# Patient Record
Sex: FEMALE | Race: WHITE | Employment: UNEMPLOYED | URBAN - METROPOLITAN AREA
[De-identification: names, ages, dates, MRNs, and addresses within clinical notes are randomized per-mention and may not be internally consistent; named-entity substitution may affect disease eponyms.]

---

## 2017-01-08 PROBLEM — R58 INTRAABDOMINAL HEMORRHAGE: Status: ACTIVE | Noted: 2017-01-08

## 2017-01-08 PROBLEM — K66.1 HEMOPERITONEUM (NONTRAUMATIC): Status: ACTIVE | Noted: 2017-01-08

## 2017-01-08 PROBLEM — D62 ACUTE POST-HEMORRHAGIC ANEMIA: Status: ACTIVE | Noted: 2017-01-08

## 2017-01-08 PROBLEM — E87.6 HYPOKALEMIA: Status: ACTIVE | Noted: 2017-01-08

## 2017-01-09 PROBLEM — R57.9 SHOCK CIRCULATORY (HCC): Status: ACTIVE | Noted: 2017-01-09

## 2017-01-09 PROBLEM — R73.9 HYPERGLYCEMIA: Status: ACTIVE | Noted: 2017-01-09

## 2017-02-02 ENCOUNTER — TELEPHONE (OUTPATIENT)
Dept: SURGERY | Age: 47
End: 2017-02-02

## 2017-02-09 ENCOUNTER — OFFICE VISIT (OUTPATIENT)
Dept: SURGERY | Age: 47
End: 2017-02-09

## 2017-02-09 VITALS
HEIGHT: 65 IN | DIASTOLIC BLOOD PRESSURE: 88 MMHG | SYSTOLIC BLOOD PRESSURE: 138 MMHG | WEIGHT: 109 LBS | BODY MASS INDEX: 18.16 KG/M2

## 2017-02-09 DIAGNOSIS — K66.1: ICD-10-CM

## 2017-02-09 DIAGNOSIS — R58 INTRAABDOMINAL HEMORRHAGE: Primary | ICD-10-CM

## 2017-02-09 PROCEDURE — 99024 POSTOP FOLLOW-UP VISIT: CPT | Performed by: SURGERY

## 2017-02-13 ENCOUNTER — TELEPHONE (OUTPATIENT)
Dept: INTERNAL MEDICINE CLINIC | Age: 47
End: 2017-02-13

## 2017-02-28 ENCOUNTER — OFFICE VISIT (OUTPATIENT)
Dept: RHEUMATOLOGY | Age: 47
End: 2017-02-28

## 2017-02-28 VITALS
SYSTOLIC BLOOD PRESSURE: 138 MMHG | HEIGHT: 65 IN | HEART RATE: 69 BPM | TEMPERATURE: 98.3 F | WEIGHT: 116 LBS | DIASTOLIC BLOOD PRESSURE: 88 MMHG | BODY MASS INDEX: 19.33 KG/M2

## 2017-02-28 DIAGNOSIS — I72.8 PSEUDOANEURYSM OF INTRA-ABDOMINAL ARTERY (HCC): Primary | ICD-10-CM

## 2017-02-28 DIAGNOSIS — D68.59 PROTEIN C DEFICIENCY (HCC): ICD-10-CM

## 2017-02-28 DIAGNOSIS — D68.59 PROTEIN S DEFICIENCY (HCC): ICD-10-CM

## 2017-02-28 PROCEDURE — 99245 OFF/OP CONSLTJ NEW/EST HI 55: CPT | Performed by: INTERNAL MEDICINE

## 2017-02-28 RX ORDER — HYDROCODONE BITARTRATE AND ACETAMINOPHEN 5; 325 MG/1; MG/1
1 TABLET ORAL EVERY 6 HOURS PRN
COMMUNITY
End: 2017-03-14 | Stop reason: ALTCHOICE

## 2017-03-02 DIAGNOSIS — I72.8 PSEUDOANEURYSM OF INTRA-ABDOMINAL ARTERY (HCC): ICD-10-CM

## 2017-03-02 LAB
BILIRUBIN URINE: NEGATIVE
BLOOD, URINE: ABNORMAL
C-REACTIVE PROTEIN: <0.3 MG/L (ref 0–5.1)
CLARITY: ABNORMAL
COLOR: YELLOW
CREATININE URINE: 229.2 MG/DL (ref 28–259)
EPITHELIAL CELLS, UA: 1 /HPF (ref 0–5)
GLUCOSE URINE: NEGATIVE MG/DL
HEPATITIS B CORE IGM ANTIBODY: NORMAL
HEPATITIS B SURFACE ANTIGEN INTERPRETATION: NORMAL
HEPATITIS C ANTIBODY INTERPRETATION: NORMAL
HYALINE CASTS: 1 /HPF (ref 0–8)
KETONES, URINE: NEGATIVE MG/DL
LEUKOCYTE ESTERASE, URINE: NEGATIVE
MICROSCOPIC EXAMINATION: YES
MUCUS: ABNORMAL /LPF
NITRITE, URINE: NEGATIVE
PH UA: 6
PROTEIN PROTEIN: 20 MG/DL
PROTEIN UA: 30 MG/DL
RBC UA: 2 /HPF (ref 0–4)
RHEUMATOID FACTOR: <10 IU/ML
SEDIMENTATION RATE, ERYTHROCYTE: 14 MM/HR (ref 0–20)
SPECIFIC GRAVITY UA: 1.02
URINE REFLEX TO CULTURE: ABNORMAL
URINE TYPE: ABNORMAL
UROBILINOGEN, URINE: 0.2 E.U./DL
WBC UA: 2 /HPF (ref 0–5)

## 2017-03-03 LAB
ALBUMIN SERPL-MCNC: 3.7 G/DL (ref 3.1–4.9)
ALPHA-1-GLOBULIN: 0.3 G/DL (ref 0.2–0.4)
ALPHA-2-GLOBULIN: 0.6 G/DL (ref 0.4–1.1)
ANTICARDIOLIPIN IGA ANTIBODY: 0 APL (ref 0–11)
ANTICARDIOLIPIN IGG ANTIBODY: 3 GPL (ref 0–14)
BETA GLOBULIN: 1.2 G/DL (ref 0.9–1.6)
BETA-2 GLYCOPROTEIN 1 IGG ANTIBODY: 0 SGU (ref 0–20)
BETA-2 GLYCOPROTEIN 1 IGM ANTIBODY: 6 SMU (ref 0–20)
CARDIOLIPIN AB IGM: 7 MPL (ref 0–12)
CCP IGG ANTIBODIES: 2 UNITS (ref 0–19)
ENA TO SSA (RO) ANTIBODY: NEGATIVE EU
ENA TO SSB (LA) ANTIBODY: NEGATIVE EU
GAMMA GLOBULIN: 1.5 G/DL (ref 0.6–1.8)
REASON FOR REJECTION: NORMAL
REJECTED TEST: NORMAL
SPE/IFE INTERPRETATION: NORMAL
TOTAL PROTEIN: 7.3 G/DL (ref 6.4–8.2)

## 2017-03-04 LAB — ANA BY IFA: ABNORMAL

## 2017-03-06 LAB — CRYOGLOBULIN, QUALITATIVE: NORMAL

## 2017-03-14 ENCOUNTER — OFFICE VISIT (OUTPATIENT)
Dept: RHEUMATOLOGY | Age: 47
End: 2017-03-14

## 2017-03-14 VITALS
WEIGHT: 114 LBS | TEMPERATURE: 97 F | HEART RATE: 64 BPM | BODY MASS INDEX: 18.99 KG/M2 | DIASTOLIC BLOOD PRESSURE: 92 MMHG | SYSTOLIC BLOOD PRESSURE: 122 MMHG

## 2017-03-14 DIAGNOSIS — R76.8 POSITIVE ANA (ANTINUCLEAR ANTIBODY): ICD-10-CM

## 2017-03-14 DIAGNOSIS — I72.8 PSEUDOANEURYSM OF INTRA-ABDOMINAL ARTERY (HCC): Primary | ICD-10-CM

## 2017-03-14 PROCEDURE — 99214 OFFICE O/P EST MOD 30 MIN: CPT | Performed by: INTERNAL MEDICINE

## 2017-03-16 ENCOUNTER — TELEPHONE (OUTPATIENT)
Dept: RHEUMATOLOGY | Age: 47
End: 2017-03-16

## 2017-03-16 DIAGNOSIS — I72.9 PSEUDOANEURYSM (HCC): Primary | ICD-10-CM

## 2017-03-22 ENCOUNTER — HOSPITAL ENCOUNTER (OUTPATIENT)
Dept: CT IMAGING | Age: 47
Discharge: HOME | End: 2017-03-22
Attending: INTERNAL MEDICINE | Admitting: INTERNAL MEDICINE

## 2017-03-22 DIAGNOSIS — I72.9 PSEUDOANEURYSM (HCC): ICD-10-CM

## 2017-03-22 DIAGNOSIS — R76.8 POSITIVE ANA (ANTINUCLEAR ANTIBODY): ICD-10-CM

## 2017-03-22 DIAGNOSIS — I72.9 ANEURYSM (HCC): ICD-10-CM

## 2017-03-22 LAB
GFR AFRICAN AMERICAN: >60
GFR NON-AFRICAN AMERICAN: 59
PERFORMED ON: ABNORMAL
POC CREATININE: 1 MG/DL (ref 0.6–1.1)
POC SAMPLE TYPE: ABNORMAL

## 2017-03-24 LAB
DOUBLE STRANDED DNA AB, IGG: NORMAL
ENA TO RNP ANTIBODY: NEGATIVE EU
ENA TO SMITH (SM) ANTIBODY: NEGATIVE EU

## 2017-04-04 ENCOUNTER — OFFICE VISIT (OUTPATIENT)
Dept: RHEUMATOLOGY | Age: 47
End: 2017-04-04

## 2017-04-04 VITALS
TEMPERATURE: 98.8 F | HEART RATE: 68 BPM | SYSTOLIC BLOOD PRESSURE: 126 MMHG | WEIGHT: 115 LBS | DIASTOLIC BLOOD PRESSURE: 88 MMHG | BODY MASS INDEX: 19.16 KG/M2

## 2017-04-04 DIAGNOSIS — R76.8 POSITIVE ANA (ANTINUCLEAR ANTIBODY): ICD-10-CM

## 2017-04-04 DIAGNOSIS — I72.8 PSEUDOANEURYSM OF INTRA-ABDOMINAL ARTERY (HCC): Primary | ICD-10-CM

## 2017-04-04 PROCEDURE — 99214 OFFICE O/P EST MOD 30 MIN: CPT | Performed by: INTERNAL MEDICINE

## 2017-06-19 ENCOUNTER — PAT TELEPHONE (OUTPATIENT)
Dept: PREADMISSION TESTING | Age: 47
End: 2017-06-19

## 2017-06-19 VITALS — HEIGHT: 65 IN | BODY MASS INDEX: 19.16 KG/M2 | WEIGHT: 115 LBS

## 2017-06-22 ENCOUNTER — HOSPITAL ENCOUNTER (OUTPATIENT)
Dept: ENDOSCOPY | Age: 47
Discharge: HOME | End: 2017-06-22
Attending: INTERNAL MEDICINE | Admitting: INTERNAL MEDICINE

## 2017-06-22 VITALS
SYSTOLIC BLOOD PRESSURE: 148 MMHG | DIASTOLIC BLOOD PRESSURE: 91 MMHG | WEIGHT: 113.1 LBS | HEART RATE: 51 BPM | RESPIRATION RATE: 16 BRPM | OXYGEN SATURATION: 98 % | TEMPERATURE: 97.4 F | BODY MASS INDEX: 18.82 KG/M2

## 2017-06-22 RX ORDER — OXYCODONE HYDROCHLORIDE AND ACETAMINOPHEN 5; 325 MG/1; MG/1
1 TABLET ORAL PRN
Status: ACTIVE | OUTPATIENT
Start: 2017-06-22 | End: 2017-06-22

## 2017-06-22 RX ORDER — OXYCODONE HYDROCHLORIDE AND ACETAMINOPHEN 5; 325 MG/1; MG/1
2 TABLET ORAL PRN
Status: ACTIVE | OUTPATIENT
Start: 2017-06-22 | End: 2017-06-22

## 2017-06-22 RX ORDER — FENTANYL CITRATE 50 UG/ML
25 INJECTION, SOLUTION INTRAMUSCULAR; INTRAVENOUS EVERY 5 MIN PRN
Status: DISCONTINUED | OUTPATIENT
Start: 2017-06-22 | End: 2017-06-23 | Stop reason: HOSPADM

## 2017-06-22 RX ORDER — FENTANYL CITRATE 50 UG/ML
50 INJECTION, SOLUTION INTRAMUSCULAR; INTRAVENOUS EVERY 5 MIN PRN
Status: DISCONTINUED | OUTPATIENT
Start: 2017-06-22 | End: 2017-06-23 | Stop reason: HOSPADM

## 2017-06-22 RX ORDER — MORPHINE SULFATE 2 MG/ML
1 INJECTION, SOLUTION INTRAMUSCULAR; INTRAVENOUS EVERY 5 MIN PRN
Status: DISCONTINUED | OUTPATIENT
Start: 2017-06-22 | End: 2017-06-23 | Stop reason: HOSPADM

## 2017-06-22 RX ORDER — ONDANSETRON 2 MG/ML
4 INJECTION INTRAMUSCULAR; INTRAVENOUS
Status: ACTIVE | OUTPATIENT
Start: 2017-06-22 | End: 2017-06-22

## 2017-06-22 RX ORDER — SODIUM CHLORIDE 0.9 % (FLUSH) 0.9 %
10 SYRINGE (ML) INJECTION PRN
Status: DISCONTINUED | OUTPATIENT
Start: 2017-06-22 | End: 2017-06-23 | Stop reason: HOSPADM

## 2017-06-22 RX ORDER — SODIUM CHLORIDE 9 MG/ML
INJECTION, SOLUTION INTRAVENOUS CONTINUOUS
Status: DISCONTINUED | OUTPATIENT
Start: 2017-06-22 | End: 2017-06-23 | Stop reason: HOSPADM

## 2017-06-22 RX ORDER — SODIUM CHLORIDE 0.9 % (FLUSH) 0.9 %
10 SYRINGE (ML) INJECTION EVERY 12 HOURS SCHEDULED
Status: DISCONTINUED | OUTPATIENT
Start: 2017-06-22 | End: 2017-06-23 | Stop reason: HOSPADM

## 2017-06-22 RX ORDER — MORPHINE SULFATE 2 MG/ML
2 INJECTION, SOLUTION INTRAMUSCULAR; INTRAVENOUS EVERY 5 MIN PRN
Status: DISCONTINUED | OUTPATIENT
Start: 2017-06-22 | End: 2017-06-23 | Stop reason: HOSPADM

## 2017-06-22 RX ADMIN — SODIUM CHLORIDE: 9 INJECTION, SOLUTION INTRAVENOUS at 11:19

## 2017-06-22 ASSESSMENT — PAIN SCALES - GENERAL
PAINLEVEL_OUTOF10: 0

## 2017-06-22 ASSESSMENT — PAIN - FUNCTIONAL ASSESSMENT: PAIN_FUNCTIONAL_ASSESSMENT: 0-10

## 2017-06-22 ASSESSMENT — ENCOUNTER SYMPTOMS: SHORTNESS OF BREATH: 0

## 2017-07-06 ENCOUNTER — OFFICE VISIT (OUTPATIENT)
Dept: RHEUMATOLOGY | Age: 47
End: 2017-07-06

## 2017-07-06 VITALS
BODY MASS INDEX: 19.39 KG/M2 | WEIGHT: 116.4 LBS | SYSTOLIC BLOOD PRESSURE: 120 MMHG | HEART RATE: 60 BPM | HEIGHT: 65 IN | DIASTOLIC BLOOD PRESSURE: 84 MMHG

## 2017-07-06 DIAGNOSIS — R76.8 POSITIVE ANA (ANTINUCLEAR ANTIBODY): ICD-10-CM

## 2017-07-06 DIAGNOSIS — I72.8 PSEUDOANEURYSM OF INTRA-ABDOMINAL ARTERY (HCC): Primary | ICD-10-CM

## 2017-07-06 PROCEDURE — 99214 OFFICE O/P EST MOD 30 MIN: CPT | Performed by: INTERNAL MEDICINE

## 2017-07-13 ENCOUNTER — HOSPITAL ENCOUNTER (OUTPATIENT)
Dept: CT IMAGING | Age: 47
Discharge: HOME | End: 2017-07-13
Attending: INTERNAL MEDICINE | Admitting: INTERNAL MEDICINE

## 2017-07-13 DIAGNOSIS — I72.8 PSEUDOANEURYSM OF INTRA-ABDOMINAL ARTERY (HCC): ICD-10-CM

## 2017-07-13 LAB
A/G RATIO: 1.5 (ref 1.1–2.2)
ALBUMIN SERPL-MCNC: 4.3 G/DL (ref 3.4–5)
ALP BLD-CCNC: 73 U/L (ref 40–129)
ALT SERPL-CCNC: 26 U/L (ref 10–40)
ANION GAP SERPL CALCULATED.3IONS-SCNC: 9 MMOL/L (ref 3–16)
AST SERPL-CCNC: 22 U/L (ref 15–37)
BASOPHILS ABSOLUTE: 0 K/UL (ref 0–0.2)
BASOPHILS RELATIVE PERCENT: 1.1 %
BILIRUB SERPL-MCNC: 1 MG/DL (ref 0–1)
BUN BLDV-MCNC: 14 MG/DL (ref 7–20)
CALCIUM SERPL-MCNC: 9.5 MG/DL (ref 8.3–10.6)
CHLORIDE BLD-SCNC: 101 MMOL/L (ref 99–110)
CO2: 33 MMOL/L (ref 21–32)
CREAT SERPL-MCNC: 0.7 MG/DL (ref 0.6–1.1)
EOSINOPHILS ABSOLUTE: 0.2 K/UL (ref 0–0.6)
EOSINOPHILS RELATIVE PERCENT: 5.4 %
GFR AFRICAN AMERICAN: >60
GFR NON-AFRICAN AMERICAN: >60
GLOBULIN: 2.8 G/DL
GLUCOSE BLD-MCNC: 91 MG/DL (ref 70–99)
HCT VFR BLD CALC: 43.9 % (ref 36–48)
HEMOGLOBIN: 14.9 G/DL (ref 12–16)
LYMPHOCYTES ABSOLUTE: 1.4 K/UL (ref 1–5.1)
LYMPHOCYTES RELATIVE PERCENT: 41.2 %
MCH RBC QN AUTO: 31.8 PG (ref 26–34)
MCHC RBC AUTO-ENTMCNC: 34 G/DL (ref 31–36)
MCV RBC AUTO: 93.5 FL (ref 80–100)
MONOCYTES ABSOLUTE: 0.3 K/UL (ref 0–1.3)
MONOCYTES RELATIVE PERCENT: 8.6 %
NEUTROPHILS ABSOLUTE: 1.5 K/UL (ref 1.7–7.7)
NEUTROPHILS RELATIVE PERCENT: 43.7 %
PDW BLD-RTO: 13.9 % (ref 12.4–15.4)
PLATELET # BLD: 192 K/UL (ref 135–450)
PMV BLD AUTO: 8.6 FL (ref 5–10.5)
POTASSIUM SERPL-SCNC: 3.3 MMOL/L (ref 3.5–5.1)
RBC # BLD: 4.69 M/UL (ref 4–5.2)
SODIUM BLD-SCNC: 143 MMOL/L (ref 136–145)
TOTAL PROTEIN: 7.1 G/DL (ref 6.4–8.2)
WBC # BLD: 3.5 K/UL (ref 4–11)

## 2017-07-16 PROBLEM — Z08 ENCOUNTER FOR FOLLOW-UP EXAMINATION AFTER COMPLETED TREATMENT FOR MALIGNANT NEOPLASM: Status: ACTIVE | Noted: 2017-07-16

## 2017-11-17 ENCOUNTER — HOSPITAL ENCOUNTER (OUTPATIENT)
Dept: CT IMAGING | Age: 47
Discharge: HOME | End: 2017-11-17
Attending: SURGERY | Admitting: SURGERY

## 2017-11-17 DIAGNOSIS — I71.40 ABDOMINAL AORTIC ANEURYSM WITHOUT RUPTURE: ICD-10-CM

## 2018-01-10 ENCOUNTER — OFFICE VISIT (OUTPATIENT)
Dept: RHEUMATOLOGY | Age: 48
End: 2018-01-10

## 2018-01-10 VITALS
SYSTOLIC BLOOD PRESSURE: 118 MMHG | BODY MASS INDEX: 19.83 KG/M2 | HEIGHT: 65 IN | WEIGHT: 119 LBS | DIASTOLIC BLOOD PRESSURE: 82 MMHG

## 2018-01-10 DIAGNOSIS — E16.2 LOW BLOOD GLUCOSE MEASUREMENT: Primary | ICD-10-CM

## 2018-01-10 DIAGNOSIS — I72.8 PSEUDOANEURYSM OF INTRA-ABDOMINAL ARTERY (HCC): ICD-10-CM

## 2018-01-10 DIAGNOSIS — R76.8 POSITIVE ANA (ANTINUCLEAR ANTIBODY): ICD-10-CM

## 2018-01-10 DIAGNOSIS — I72.8 PSEUDOANEURYSM OF INTRA-ABDOMINAL ARTERY (HCC): Primary | ICD-10-CM

## 2018-01-10 LAB
A/G RATIO: 1.9 (ref 1.1–2.2)
ALBUMIN SERPL-MCNC: 4.3 G/DL (ref 3.4–5)
ALP BLD-CCNC: 62 U/L (ref 40–129)
ALT SERPL-CCNC: 22 U/L (ref 10–40)
ANION GAP SERPL CALCULATED.3IONS-SCNC: 10 MMOL/L (ref 3–16)
AST SERPL-CCNC: 22 U/L (ref 15–37)
BASOPHILS ABSOLUTE: 0 K/UL (ref 0–0.2)
BASOPHILS RELATIVE PERCENT: 0.7 %
BILIRUB SERPL-MCNC: 0.5 MG/DL (ref 0–1)
BUN BLDV-MCNC: 24 MG/DL (ref 7–20)
C-REACTIVE PROTEIN: <0.3 MG/L (ref 0–5.1)
CALCIUM SERPL-MCNC: 9.8 MG/DL (ref 8.3–10.6)
CHLORIDE BLD-SCNC: 97 MMOL/L (ref 99–110)
CO2: 34 MMOL/L (ref 21–32)
CREAT SERPL-MCNC: 0.7 MG/DL (ref 0.6–1.1)
EOSINOPHILS ABSOLUTE: 0.2 K/UL (ref 0–0.6)
EOSINOPHILS RELATIVE PERCENT: 3.7 %
GFR AFRICAN AMERICAN: >60
GFR NON-AFRICAN AMERICAN: >60
GLOBULIN: 2.3 G/DL
GLUCOSE BLD-MCNC: 55 MG/DL (ref 70–99)
HCT VFR BLD CALC: 42.2 % (ref 36–48)
HEMOGLOBIN: 14 G/DL (ref 12–16)
LYMPHOCYTES ABSOLUTE: 1.2 K/UL (ref 1–5.1)
LYMPHOCYTES RELATIVE PERCENT: 29.4 %
MCH RBC QN AUTO: 32.6 PG (ref 26–34)
MCHC RBC AUTO-ENTMCNC: 33.2 G/DL (ref 31–36)
MCV RBC AUTO: 98.3 FL (ref 80–100)
MONOCYTES ABSOLUTE: 0.4 K/UL (ref 0–1.3)
MONOCYTES RELATIVE PERCENT: 10.6 %
NEUTROPHILS ABSOLUTE: 2.3 K/UL (ref 1.7–7.7)
NEUTROPHILS RELATIVE PERCENT: 55.6 %
PDW BLD-RTO: 13.3 % (ref 12.4–15.4)
PLATELET # BLD: 188 K/UL (ref 135–450)
PMV BLD AUTO: 9.3 FL (ref 5–10.5)
POTASSIUM SERPL-SCNC: 3.7 MMOL/L (ref 3.5–5.1)
RBC # BLD: 4.29 M/UL (ref 4–5.2)
SEDIMENTATION RATE, ERYTHROCYTE: 11 MM/HR (ref 0–20)
SODIUM BLD-SCNC: 141 MMOL/L (ref 136–145)
TOTAL PROTEIN: 6.6 G/DL (ref 6.4–8.2)
WBC # BLD: 4.2 K/UL (ref 4–11)

## 2018-01-10 PROCEDURE — 99214 OFFICE O/P EST MOD 30 MIN: CPT | Performed by: INTERNAL MEDICINE

## 2018-01-10 NOTE — PROGRESS NOTES
for vasculitis, SPEP/UPEP, no proteinuria, negative APL panel, negative cryoglobulins  -Low titer positive PATRICIO, no other evidence of connective tissue disease  - CTA abdominal aorta on November 2017 without evidence of high-grade stenosis or arterial occlusion  -Continue to monitor periodically  - CBC Auto Differential; Future  - Comprehensive Metabolic Panel; Future  - C-Reactive Protein; Future  - Sedimentation Rate; Future    2. Positive PATRICIO (antinuclear antibody)  1:160 mixed homogeneous and speckled pattern  -Other serologies negative  No signs and symptoms of connective tissue disease  - continue to monitor   -I The patient indicates understanding of these issues and agrees with the plan. Return in about 6 months (around 7/10/2018). The risks and benefits of my recommendations, as well as other treatment options, benefits and side effects were discussed with the patient. All questions were answered. ######################################################################    I thank you for giving me the opportunity to participate in [facility]. If you have any questions or concerns please feel free to contact me. I look forward to following  Alia See along with you. Electronically signed by: Latoya Jimenez MD, 1/10/2018 3:25 PM    Documentation was done using voice recognition dragon software. Every effort was made to ensure accuracy; however, inadvertent unintentional computerized transcription errors may be present.

## 2018-06-05 ENCOUNTER — HOSPITAL ENCOUNTER (OUTPATIENT)
Dept: MRI IMAGING | Age: 48
Discharge: HOME | End: 2018-06-05
Attending: INTERNAL MEDICINE | Admitting: INTERNAL MEDICINE

## 2018-06-05 DIAGNOSIS — K86.2 CYST OF PANCREAS: ICD-10-CM

## 2018-06-05 DIAGNOSIS — K86.3 CYST AND PSEUDOCYST OF PANCREAS: ICD-10-CM

## 2018-06-05 DIAGNOSIS — K86.2 CYST AND PSEUDOCYST OF PANCREAS: ICD-10-CM

## 2018-07-10 ENCOUNTER — OFFICE VISIT (OUTPATIENT)
Dept: RHEUMATOLOGY | Age: 48
End: 2018-07-10

## 2018-07-10 VITALS
HEIGHT: 65 IN | HEART RATE: 64 BPM | BODY MASS INDEX: 19.22 KG/M2 | SYSTOLIC BLOOD PRESSURE: 140 MMHG | DIASTOLIC BLOOD PRESSURE: 100 MMHG | WEIGHT: 115.4 LBS

## 2018-07-10 DIAGNOSIS — R76.8 POSITIVE ANA (ANTINUCLEAR ANTIBODY): ICD-10-CM

## 2018-07-10 DIAGNOSIS — I72.8 PSEUDOANEURYSM OF INTRA-ABDOMINAL ARTERY (HCC): Primary | ICD-10-CM

## 2018-07-10 PROCEDURE — 99214 OFFICE O/P EST MOD 30 MIN: CPT | Performed by: INTERNAL MEDICINE

## 2018-07-10 RX ORDER — HYDROCHLOROTHIAZIDE 12.5 MG/1
12.5 CAPSULE, GELATIN COATED ORAL DAILY
COMMUNITY

## 2018-07-10 NOTE — PROGRESS NOTES
noted   Neurologic:   Alert & oriented x 3, CN 2-12 normal, no focal deficits noted. Sensations Intact. Muscle strength 5/5 proximally and distally in upper and lower extremities. Psychiatric:  Speech and behavior appropriate           LABS AND IMAGING    Lab Results   Component Value Date    WBC 4.2 01/10/2018    HGB 14.0 01/10/2018    HCT 42.2 01/10/2018    MCV 98.3 01/10/2018     01/10/2018    LABLYMP 1.8 07/05/2016    MID 0.4 07/05/2016    GRAN 3.1 07/05/2016    LYMPHOPCT 29.4 01/10/2018    MIDPERCENT 7.8 07/05/2016    GRANULOCYTES 58.8 07/05/2016    RBC 4.29 01/10/2018    MCH 32.6 01/10/2018    MCHC 33.2 01/10/2018    RDW 13.3 01/10/2018     Lab Results   Component Value Date    CREATININE 0.7 01/10/2018    BUN 24 (H) 01/10/2018     01/10/2018    K 3.7 01/10/2018    CL 97 (L) 01/10/2018    CO2 34 (H) 01/10/2018     Lab Results   Component Value Date    ALT 22 01/10/2018    AST 22 01/10/2018    ALKPHOS 62 01/10/2018    BILITOT 0.5 01/10/2018     CT abdominal aorta 11/17/2017   EXAMINATION:   CTA OF THE AORTA WITH LOWER EXTREMITY RUNOFF 11/17/2017 9:20 am       TECHNIQUE:   CTA of the pelvis and bilateral lower extremities was performed after the   administration of intravenous contrast.   Multiplanar reformatted images are   provided for review.  MIP images are provided for review. Dose modulation,   iterative reconstruction, and/or weight based adjustment of the mA/kV was   utilized to reduce the radiation dose to as low as reasonably achievable.       COMPARISON:   01/22/2017       HISTORY:   ORDERING PHYSICIAN PROVIDED HISTORY: Abdominal aortic aneurysm without   rupture [facility])   TECHNOLOGIST PROVIDED HISTORY:   Technologist Provided Reason for Exam: follow up aaa repair and also colon   removed at that time fro ischemia.  hx hyst. hx breast ca left mast/chemo/rad   2009   Acuity: Chronic   Type of Encounter: Subsequent/Follow-up       FINDINGS:   Nonvascular       Lower Chest: Lung bases are clear.  Patient status post left mastectomy.       Organs: Liver, gallbladder, pancreas, spleen and adrenal glands are   unremarkable. Brunetta Pert demonstrate no enhancing mass or hydronephrosis.       GI/Bowel: Bowel loops are nondistended.  There is moderate colonic stool.  No   inflammation or fluid collection noted.       Pelvis: Bladder is decompressed.  Uterus is surgically absent.  No adnexal   mass or fluid collection identified.       Peritoneum/Retroperitoneum: There are surgical clips in the right   retroperitoneum posterior to the pancreas and anterior to the IVC.       Bones/Soft Tissues: Chronic degenerative changes of the bones are seen.       VASCULAR       The aorta is normal in caliber.  It is somewhat tortuous.  The origins of the   celiac, superior mesenteric, right and left and 2nd left renal arteries are   patent.  The inferior mesenteric artery origin is patent.  Common iliac,   external iliac and common femoral arteries are patent.  The superficial   femoral and popliteal arteries are widely patent.  Below the knees, contrast   enhancement is diminished in therefore evaluation is limited.  Trifurcation   arteries are patent proximally and to the mid calf.  They are not well seen   distally.           Impression   No acute arterial occlusion or high-grade stenosis is identified.  No acute   process.           No images are attached to the encounter or orders placed in the encounter. ASSESSMENT:    1. Pseudoaneurysm of intra-abdominal artery ([address]    2. Positive PATRICIO (antinuclear antibody)        PLAN:   1.  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

## 2018-11-16 ENCOUNTER — TELEPHONE (OUTPATIENT)
Dept: INTERNAL MEDICINE CLINIC | Age: 48
End: 2018-11-16

## 2018-11-16 DIAGNOSIS — I71.9 PSEUDOANEURYSM OF AORTA (HCC): Primary | ICD-10-CM

## 2018-11-28 ENCOUNTER — HOSPITAL ENCOUNTER (OUTPATIENT)
Dept: CT IMAGING | Age: 48
Discharge: HOME | End: 2018-11-28
Payer: COMMERCIAL

## 2018-11-28 DIAGNOSIS — I72.8 PSEUDOANEURYSM OF INTRA-ABDOMINAL ARTERY (HCC): ICD-10-CM

## 2018-11-28 DIAGNOSIS — I71.9 PSEUDOANEURYSM OF AORTA (HCC): ICD-10-CM

## 2018-11-28 LAB
A/G RATIO: 1.5 (ref 1.1–2.2)
ALBUMIN SERPL-MCNC: 4.2 G/DL (ref 3.4–5)
ALP BLD-CCNC: 64 U/L (ref 40–129)
ALT SERPL-CCNC: 25 U/L (ref 10–40)
ANION GAP SERPL CALCULATED.3IONS-SCNC: 11 MMOL/L (ref 3–16)
AST SERPL-CCNC: 26 U/L (ref 15–37)
BASOPHILS ABSOLUTE: 0 K/UL (ref 0–0.2)
BASOPHILS RELATIVE PERCENT: 1 %
BILIRUB SERPL-MCNC: 0.8 MG/DL (ref 0–1)
BUN BLDV-MCNC: 22 MG/DL (ref 7–20)
C-REACTIVE PROTEIN: <0.3 MG/L (ref 0–5.1)
CALCIUM SERPL-MCNC: 9.6 MG/DL (ref 8.3–10.6)
CHLORIDE BLD-SCNC: 99 MMOL/L (ref 99–110)
CO2: 29 MMOL/L (ref 21–32)
CREAT SERPL-MCNC: 0.7 MG/DL (ref 0.6–1.1)
EOSINOPHILS ABSOLUTE: 0.1 K/UL (ref 0–0.6)
EOSINOPHILS RELATIVE PERCENT: 4.3 %
GFR AFRICAN AMERICAN: >60
GFR NON-AFRICAN AMERICAN: >60
GLOBULIN: 2.8 G/DL
GLUCOSE BLD-MCNC: 78 MG/DL (ref 70–99)
HCT VFR BLD CALC: 41.4 % (ref 36–48)
HEMOGLOBIN: 13.9 G/DL (ref 12–16)
LYMPHOCYTES ABSOLUTE: 1.3 K/UL (ref 1–5.1)
LYMPHOCYTES RELATIVE PERCENT: 38.2 %
MCH RBC QN AUTO: 32.3 PG (ref 26–34)
MCHC RBC AUTO-ENTMCNC: 33.7 G/DL (ref 31–36)
MCV RBC AUTO: 95.8 FL (ref 80–100)
MONOCYTES ABSOLUTE: 0.3 K/UL (ref 0–1.3)
MONOCYTES RELATIVE PERCENT: 8.6 %
NEUTROPHILS ABSOLUTE: 1.7 K/UL (ref 1.7–7.7)
NEUTROPHILS RELATIVE PERCENT: 47.9 %
PDW BLD-RTO: 12.7 % (ref 12.4–15.4)
PLATELET # BLD: 198 K/UL (ref 135–450)
PMV BLD AUTO: 9.3 FL (ref 5–10.5)
POTASSIUM SERPL-SCNC: 3.4 MMOL/L (ref 3.5–5.1)
RBC # BLD: 4.32 M/UL (ref 4–5.2)
SEDIMENTATION RATE, ERYTHROCYTE: 9 MM/HR (ref 0–20)
SODIUM BLD-SCNC: 139 MMOL/L (ref 136–145)
TOTAL PROTEIN: 7 G/DL (ref 6.4–8.2)
WBC # BLD: 3.5 K/UL (ref 4–11)

## 2018-11-28 PROCEDURE — 85652 RBC SED RATE AUTOMATED: CPT

## 2018-11-28 PROCEDURE — 36415 COLL VENOUS BLD VENIPUNCTURE: CPT

## 2018-11-28 PROCEDURE — 85025 COMPLETE CBC W/AUTO DIFF WBC: CPT

## 2018-11-28 PROCEDURE — 86140 C-REACTIVE PROTEIN: CPT

## 2018-11-28 PROCEDURE — 6360000004 HC RX CONTRAST MEDICATION: Performed by: INTERNAL MEDICINE

## 2018-11-28 PROCEDURE — 80053 COMPREHEN METABOLIC PANEL: CPT

## 2018-11-28 PROCEDURE — 75635 CT ANGIO ABDOMINAL ARTERIES: CPT

## 2018-11-28 RX ADMIN — IOPAMIDOL 75 ML: 755 INJECTION, SOLUTION INTRAVENOUS at 08:09

## 2019-01-03 ENCOUNTER — TELEPHONE (OUTPATIENT)
Dept: SURGERY | Age: 49
End: 2019-01-03

## 2019-06-17 ENCOUNTER — HOSPITAL ENCOUNTER (OUTPATIENT)
Dept: OTHER | Age: 49
Discharge: HOME | End: 2019-06-17

## 2019-06-17 VITALS
WEIGHT: 114.25 LBS | DIASTOLIC BLOOD PRESSURE: 109 MMHG | BODY MASS INDEX: 19.01 KG/M2 | HEART RATE: 67 BPM | SYSTOLIC BLOOD PRESSURE: 143 MMHG

## 2019-06-17 PROCEDURE — 9900000078 HC MONA LISA TOUCH THERAPY PRE PAY

## 2019-06-17 NOTE — PROGRESS NOTES
by mouth daily       Omega-3 Fatty Acids (FISH OIL) 1200 MG CAPS Take 1 capsule by mouth daily       Calcium-Magnesium-Vitamin D (CALCIUM 500 PO) Take  by mouth 2 times daily.  carvedilol (COREG) 3.125 MG tablet Take 3.125 mg by mouth 2 times daily (with meals). No current facility-administered medications on file prior to encounter. REVIEW OF SYSTEMS    Pertinent items are noted in HPI.     Objective:      BP (!) 143/109   Pulse 67   Wt 114 lb 4 oz (51.8 kg)   BMI 19.01 kg/m²     Wt Readings from Last 2 Encounters:   06/17/19 114 lb 4 oz (51.8 kg)   07/10/18 115 lb 6.4 oz (52.3 kg)       PHYSICAL EXAM    General Appearance: alert and oriented to person, place and time, well developed and well- nourished, in no acute distress  Skin: warm and dry, no rash or erythema  Head: normocephalic and atraumatic  Eyes: pupils equal, round, and reactive to light, extraocular eye movements intact, conjunctivae normal  ENT: tympanic membrane, external ear and ear canal normal bilaterally, nose without deformity, nasal mucosa and turbinates normal without polyps  Neck: supple and non-tender without mass, no thyromegaly or thyroid nodules, no cervical lymphadenopathy  Pulmonary/Chest: clear to auscultation bilaterally- no wheezes, rales or rhonchi, normal air movement, no respiratory distress  Cardiovascular: normal rate, regular rhythm, normal S1 and S2, no murmurs, rubs, clicks, or gallops, distal pulses intact, no carotid bruits  Abdomen: soft, non-tender, non-distended, normal bowel sounds, no masses or organomegaly  Pelvic: normal external genitalia, vulva, atrophic vagina, surgically absent cervix and uterus   Extremities: no cyanosis, clubbing or edema  Musculoskeletal: normal range of motion, no joint swelling, deformity or tenderness  Neurologic: reflexes normal and symmetric, no cranial nerve deficit, gait, coordination and speech normal      Assessment:     Patient Active Problem List   Diagnosis MD on 6/17/2019 at 11:28 AM

## 2019-06-17 NOTE — FLOWSHEET NOTE
Patient here for her first Jasmin Quan treatment. Chart reviewed with patient r/t medications, medical and surgical hx.

## 2019-06-17 NOTE — FLOWSHEET NOTE
Discharge instructions provided. Pt. Verbalized understanding. Patient left the [facility] in stable condition.

## 2019-08-05 ENCOUNTER — HOSPITAL ENCOUNTER (OUTPATIENT)
Dept: OTHER | Age: 49
Discharge: HOME | End: 2019-08-05

## 2019-08-05 VITALS
WEIGHT: 112.38 LBS | BODY MASS INDEX: 18.7 KG/M2 | HEART RATE: 66 BPM | DIASTOLIC BLOOD PRESSURE: 93 MMHG | SYSTOLIC BLOOD PRESSURE: 148 MMHG

## 2019-08-05 PROCEDURE — 9900000078 HC MONA LISA TOUCH THERAPY PRE PAY

## 2019-09-16 ENCOUNTER — HOSPITAL ENCOUNTER (OUTPATIENT)
Dept: OTHER | Age: 49
Discharge: HOME | End: 2019-09-16

## 2019-09-16 VITALS — WEIGHT: 114 LBS | BODY MASS INDEX: 18.97 KG/M2 | SYSTOLIC BLOOD PRESSURE: 152 MMHG | DIASTOLIC BLOOD PRESSURE: 100 MMHG

## 2019-09-16 PROCEDURE — 9900000078 HC MONA LISA TOUCH THERAPY PRE PAY

## 2019-09-16 NOTE — PROGRESS NOTES
by mouth daily       Omega-3 Fatty Acids (FISH OIL) 1200 MG CAPS Take 1 capsule by mouth daily       Calcium-Magnesium-Vitamin D (CALCIUM 500 PO) Take  by mouth 2 times daily.  carvedilol (COREG) 3.125 MG tablet Take 3.125 mg by mouth 2 times daily (with meals). No current facility-administered medications on file prior to encounter. REVIEW OF SYSTEMS    Pertinent items are noted in HPI. Objective: There were no vitals taken for this visit. Wt Readings from Last 2 Encounters:   08/05/19 112 lb 6 oz (51 kg)   06/17/19 114 lb 4 oz (51.8 kg)       PHYSICAL EXAM    Pelvic: normal external genitalia, vulva, atrophic vagina, cervix, uterus and adnexa      Assessment:     Patient Active Problem List   Diagnosis    Malignant neoplasm of central portion of left female breast [facility] [address]    Encounter for monitoring aromatase inhibitor therapy    Osteopenia due to cancer therapy    Nontraumatic hemoperitoneum    Acute post-hemorrhagic anemia    Lactic acidosis    Hypokalemia    Hyperglycemia    Shock circulatory ([address]    Intraabdominal hemorrhage    Encounter for follow-up examination after completed treatment for malignant neoplasm        Procedure Note  Indications:  Based on my examination of this patient today, laser treatment with the  Care is required to promote collagen growth. Performed by: Tyrone Dye MD    Consent obtained:  Yes    Treatment options were fully discussed with patient. The patient fully verbalized understanding regarding  Care treatments for the above diagnosis.      Patient is placed in lithotomy position:  Yes    Time out taken:  Yes    Pain Control: N/A    Proper protective laser eyewear utilized: Yes      Care: internal     Care Treatment Settings Internal:  [30] W Dwell time 1000, spacing 1000, Smart Stack 3     Solid Internal 90 degree Vaginal Probe was inserted until resist was met at the end of the vaginal canal.  A series of pulses were then delivered. The probe was rotated 90 degrees throughout the procedure. Probe rotation at  12, 2, 4, 6, 8, [facility] Treatment Setting External:  N/A     Vaginal ring utilized: No     Smoke Evacuator: Yes           Procedural Pain:  0  / 10     Post Procedural Pain:  0 / 10     Response to treatment:  Well tolerated by patient. Plan:     Treatment Note please see attached Discharge Instructions    In my professional opinion this patient would benefit from Continued Treatment: Undecided    Electronically signed by Nicol Anne MD on 9/16/2019 at 12:17 PM    Written patient dismissal instructions given to patient and signed by patient or POA.              Electronically signed by Nicol Anne MD on 9/16/2019 at 12:17 PM

## 2019-11-23 ENCOUNTER — HOSPITAL ENCOUNTER (EMERGENCY)
Age: 49
Discharge: HOME | End: 2019-11-24
Attending: EMERGENCY MEDICINE
Payer: COMMERCIAL

## 2019-11-23 DIAGNOSIS — R07.89 ATYPICAL CHEST PAIN: Primary | ICD-10-CM

## 2019-11-23 LAB
BASOPHILS ABSOLUTE: 0 K/UL (ref 0–0.2)
BASOPHILS RELATIVE PERCENT: 1 %
EOSINOPHILS ABSOLUTE: 0.2 K/UL (ref 0–0.6)
EOSINOPHILS RELATIVE PERCENT: 5.7 %
HCT VFR BLD CALC: 41.3 % (ref 36–48)
HEMOGLOBIN: 14.2 G/DL (ref 12–16)
LYMPHOCYTES ABSOLUTE: 1.6 K/UL (ref 1–5.1)
LYMPHOCYTES RELATIVE PERCENT: 39.1 %
MCH RBC QN AUTO: 33 PG (ref 26–34)
MCHC RBC AUTO-ENTMCNC: 34.4 G/DL (ref 31–36)
MCV RBC AUTO: 95.8 FL (ref 80–100)
MONOCYTES ABSOLUTE: 0.4 K/UL (ref 0–1.3)
MONOCYTES RELATIVE PERCENT: 9.8 %
NEUTROPHILS ABSOLUTE: 1.8 K/UL (ref 1.7–7.7)
NEUTROPHILS RELATIVE PERCENT: 44.4 %
PDW BLD-RTO: 13 % (ref 12.4–15.4)
PLATELET # BLD: 192 K/UL (ref 135–450)
PMV BLD AUTO: 8.8 FL (ref 5–10.5)
RBC # BLD: 4.31 M/UL (ref 4–5.2)
WBC # BLD: 4.1 K/UL (ref 4–11)

## 2019-11-23 PROCEDURE — 93005 ELECTROCARDIOGRAM TRACING: CPT | Performed by: EMERGENCY MEDICINE

## 2019-11-23 PROCEDURE — 99285 EMERGENCY DEPT VISIT HI MDM: CPT

## 2019-11-23 PROCEDURE — 83690 ASSAY OF LIPASE: CPT

## 2019-11-23 PROCEDURE — 83735 ASSAY OF MAGNESIUM: CPT

## 2019-11-23 PROCEDURE — 84484 ASSAY OF TROPONIN QUANT: CPT

## 2019-11-23 PROCEDURE — 80053 COMPREHEN METABOLIC PANEL: CPT

## 2019-11-23 PROCEDURE — 85379 FIBRIN DEGRADATION QUANT: CPT

## 2019-11-23 PROCEDURE — 85025 COMPLETE CBC W/AUTO DIFF WBC: CPT

## 2019-11-23 RX ORDER — ASPIRIN 325 MG
325 TABLET ORAL ONCE
Status: COMPLETED | OUTPATIENT
Start: 2019-11-24 | End: 2019-11-24

## 2019-11-23 ASSESSMENT — PAIN DESCRIPTION - PROGRESSION: CLINICAL_PROGRESSION: GRADUALLY IMPROVING

## 2019-11-23 ASSESSMENT — PAIN - FUNCTIONAL ASSESSMENT: PAIN_FUNCTIONAL_ASSESSMENT: ACTIVITIES ARE NOT PREVENTED

## 2019-11-23 ASSESSMENT — PAIN DESCRIPTION - ORIENTATION: ORIENTATION: MID;LOWER;UPPER

## 2019-11-23 ASSESSMENT — PAIN SCALES - GENERAL: PAINLEVEL_OUTOF10: 1

## 2019-11-23 ASSESSMENT — PAIN DESCRIPTION - ONSET: ONSET: ON-GOING

## 2019-11-23 ASSESSMENT — PAIN DESCRIPTION - LOCATION: LOCATION: CHEST

## 2019-11-23 ASSESSMENT — PAIN DESCRIPTION - PAIN TYPE: TYPE: ACUTE PAIN

## 2019-11-23 ASSESSMENT — PAIN DESCRIPTION - FREQUENCY: FREQUENCY: CONTINUOUS

## 2019-11-23 ASSESSMENT — PAIN DESCRIPTION - DESCRIPTORS: DESCRIPTORS: PRESSURE

## 2019-11-24 ENCOUNTER — APPOINTMENT (OUTPATIENT)
Dept: GENERAL RADIOLOGY | Age: 49
End: 2019-11-24
Payer: COMMERCIAL

## 2019-11-24 VITALS
BODY MASS INDEX: 19.3 KG/M2 | DIASTOLIC BLOOD PRESSURE: 89 MMHG | TEMPERATURE: 98.1 F | WEIGHT: 115.96 LBS | SYSTOLIC BLOOD PRESSURE: 155 MMHG | OXYGEN SATURATION: 96 % | RESPIRATION RATE: 16 BRPM | HEART RATE: 49 BPM

## 2019-11-24 LAB
A/G RATIO: 1.5 (ref 1.1–2.2)
ALBUMIN SERPL-MCNC: 4.1 G/DL (ref 3.4–5)
ALP BLD-CCNC: 75 U/L (ref 40–129)
ALT SERPL-CCNC: 23 U/L (ref 10–40)
ANION GAP SERPL CALCULATED.3IONS-SCNC: 12 MMOL/L (ref 3–16)
AST SERPL-CCNC: 25 U/L (ref 15–37)
BILIRUB SERPL-MCNC: 0.3 MG/DL (ref 0–1)
BILIRUBIN URINE: NEGATIVE
BLOOD, URINE: NEGATIVE
BUN BLDV-MCNC: 20 MG/DL (ref 7–20)
CALCIUM SERPL-MCNC: 9.3 MG/DL (ref 8.3–10.6)
CHLORIDE BLD-SCNC: 103 MMOL/L (ref 99–110)
CLARITY: CLEAR
CO2: 26 MMOL/L (ref 21–32)
COLOR: YELLOW
CREAT SERPL-MCNC: 0.6 MG/DL (ref 0.6–1.1)
D DIMER: <200 NG/ML DDU (ref 0–229)
EPITHELIAL CELLS, UA: 0 /HPF (ref 0–5)
GFR AFRICAN AMERICAN: >60
GFR NON-AFRICAN AMERICAN: >60
GLOBULIN: 2.8 G/DL
GLUCOSE BLD-MCNC: 117 MG/DL (ref 70–99)
GLUCOSE URINE: NEGATIVE MG/DL
HYALINE CASTS: 0 /LPF (ref 0–8)
KETONES, URINE: NEGATIVE MG/DL
LEUKOCYTE ESTERASE, URINE: ABNORMAL
LIPASE: 62 U/L (ref 13–60)
MAGNESIUM: 2 MG/DL (ref 1.8–2.4)
MICROSCOPIC EXAMINATION: YES
NITRITE, URINE: NEGATIVE
PH UA: 7.5 (ref 5–8)
POTASSIUM REFLEX MAGNESIUM: 3.5 MMOL/L (ref 3.5–5.1)
PROTEIN UA: NEGATIVE MG/DL
RBC UA: 0 /HPF (ref 0–4)
SODIUM BLD-SCNC: 141 MMOL/L (ref 136–145)
SPECIFIC GRAVITY UA: 1.01 (ref 1–1.03)
TOTAL PROTEIN: 6.9 G/DL (ref 6.4–8.2)
TROPONIN: <0.01 NG/ML
URINE REFLEX TO CULTURE: YES
URINE TYPE: ABNORMAL
UROBILINOGEN, URINE: 0.2 E.U./DL
WBC UA: 1 /HPF (ref 0–5)

## 2019-11-24 PROCEDURE — 6370000000 HC RX 637 (ALT 250 FOR IP): Performed by: EMERGENCY MEDICINE

## 2019-11-24 PROCEDURE — 81001 URINALYSIS AUTO W/SCOPE: CPT

## 2019-11-24 PROCEDURE — 71046 X-RAY EXAM CHEST 2 VIEWS: CPT

## 2019-11-24 PROCEDURE — 87086 URINE CULTURE/COLONY COUNT: CPT

## 2019-11-24 RX ADMIN — ASPIRIN 325 MG ORAL TABLET 325 MG: 325 PILL ORAL at 00:09

## 2019-11-24 ASSESSMENT — ENCOUNTER SYMPTOMS
COLOR CHANGE: 0
VOMITING: 0
ABDOMINAL PAIN: 0
TROUBLE SWALLOWING: 0
COUGH: 0
PHOTOPHOBIA: 0
SHORTNESS OF BREATH: 0

## 2019-11-24 ASSESSMENT — PAIN SCALES - GENERAL
PAINLEVEL_OUTOF10: 0
PAINLEVEL_OUTOF10: 0

## 2019-11-24 ASSESSMENT — HEART SCORE: ECG: 0

## 2019-11-25 LAB
EKG ATRIAL RATE: 57 BPM
EKG DIAGNOSIS: NORMAL
EKG P AXIS: 32 DEGREES
EKG P-R INTERVAL: 172 MS
EKG Q-T INTERVAL: 446 MS
EKG QRS DURATION: 98 MS
EKG QTC CALCULATION (BAZETT): 434 MS
EKG R AXIS: 25 DEGREES
EKG T AXIS: 18 DEGREES
EKG VENTRICULAR RATE: 57 BPM
URINE CULTURE, ROUTINE: NORMAL

## 2019-11-25 PROCEDURE — 93010 ELECTROCARDIOGRAM REPORT: CPT | Performed by: INTERNAL MEDICINE

## 2020-09-03 ENCOUNTER — HOSPITAL ENCOUNTER (OUTPATIENT)
Dept: WOMENS IMAGING | Age: 50
Discharge: HOME | End: 2020-09-03
Payer: COMMERCIAL

## 2020-09-03 PROCEDURE — 77063 BREAST TOMOSYNTHESIS BI: CPT

## 2021-09-30 ENCOUNTER — HOSPITAL ENCOUNTER (OUTPATIENT)
Dept: WOMENS IMAGING | Age: 51
Discharge: HOME | End: 2021-09-30
Payer: COMMERCIAL

## 2021-09-30 DIAGNOSIS — Z12.31 VISIT FOR SCREENING MAMMOGRAM: ICD-10-CM

## 2021-09-30 PROCEDURE — 77063 BREAST TOMOSYNTHESIS BI: CPT

## 2022-02-23 ENCOUNTER — HOSPITAL ENCOUNTER (OUTPATIENT)
Dept: WOMENS IMAGING | Age: 52
Discharge: HOME | End: 2022-02-23
Payer: COMMERCIAL

## 2022-02-23 DIAGNOSIS — M81.0 POSTMENOPAUSAL OSTEOPOROSIS: ICD-10-CM

## 2022-02-23 PROCEDURE — 77080 DXA BONE DENSITY AXIAL: CPT

## 2022-09-08 ENCOUNTER — APPOINTMENT (OUTPATIENT)
Dept: GENERAL RADIOLOGY | Age: 52
End: 2022-09-08
Payer: COMMERCIAL

## 2022-09-08 ENCOUNTER — HOSPITAL ENCOUNTER (EMERGENCY)
Age: 52
Discharge: HOME | End: 2022-09-08
Attending: EMERGENCY MEDICINE
Payer: COMMERCIAL

## 2022-09-08 VITALS
RESPIRATION RATE: 16 BRPM | DIASTOLIC BLOOD PRESSURE: 94 MMHG | OXYGEN SATURATION: 100 % | HEART RATE: 59 BPM | WEIGHT: 110.67 LBS | BODY MASS INDEX: 18.44 KG/M2 | TEMPERATURE: 97.4 F | SYSTOLIC BLOOD PRESSURE: 147 MMHG | HEIGHT: 65 IN

## 2022-09-08 DIAGNOSIS — F41.1 ANXIETY STATE: ICD-10-CM

## 2022-09-08 DIAGNOSIS — R03.0 ELEVATED BLOOD PRESSURE READING: ICD-10-CM

## 2022-09-08 DIAGNOSIS — R09.89 THROAT TIGHTNESS: Primary | ICD-10-CM

## 2022-09-08 DIAGNOSIS — J02.9 SORE THROAT: ICD-10-CM

## 2022-09-08 LAB
RAPID INFLUENZA  B AGN: NEGATIVE
RAPID INFLUENZA A AGN: NEGATIVE
S PYO AG THROAT QL: NEGATIVE
SARS-COV-2, NAAT: NOT DETECTED

## 2022-09-08 PROCEDURE — 87880 STREP A ASSAY W/OPTIC: CPT

## 2022-09-08 PROCEDURE — 87804 INFLUENZA ASSAY W/OPTIC: CPT

## 2022-09-08 PROCEDURE — 87635 SARS-COV-2 COVID-19 AMP PRB: CPT

## 2022-09-08 PROCEDURE — 99284 EMERGENCY DEPT VISIT MOD MDM: CPT

## 2022-09-08 PROCEDURE — 70360 X-RAY EXAM OF NECK: CPT

## 2022-09-08 PROCEDURE — 87081 CULTURE SCREEN ONLY: CPT

## 2022-09-08 RX ORDER — LIDOCAINE HYDROCHLORIDE 20 MG/ML
15 SOLUTION OROPHARYNGEAL ONCE
Status: DISCONTINUED | OUTPATIENT
Start: 2022-09-08 | End: 2022-09-08 | Stop reason: HOSPADM

## 2022-09-08 RX ORDER — HYDROXYZINE HYDROCHLORIDE 25 MG/1
25 TABLET, FILM COATED ORAL EVERY 8 HOURS PRN
Qty: 30 TABLET | Refills: 0 | Status: SHIPPED | OUTPATIENT
Start: 2022-09-08 | End: 2022-09-18

## 2022-09-08 RX ORDER — LIDOCAINE HYDROCHLORIDE 20 MG/ML
5 SOLUTION OROPHARYNGEAL 3 TIMES DAILY PRN
Qty: 1 EACH | Refills: 0 | Status: SHIPPED | OUTPATIENT
Start: 2022-09-08 | End: 2022-09-11

## 2022-09-08 ASSESSMENT — ENCOUNTER SYMPTOMS
SHORTNESS OF BREATH: 0
VOICE CHANGE: 1
COUGH: 0
ABDOMINAL PAIN: 0
SORE THROAT: 1
NAUSEA: 0
CONSTIPATION: 0
DIARRHEA: 0
EYE PAIN: 0
RHINORRHEA: 0
TROUBLE SWALLOWING: 0
VOMITING: 0
BACK PAIN: 0

## 2022-09-08 ASSESSMENT — PAIN - FUNCTIONAL ASSESSMENT: PAIN_FUNCTIONAL_ASSESSMENT: NONE - DENIES PAIN

## 2022-09-08 NOTE — ED TRIAGE NOTES
Pt arrives ambulatory for eval of feeling of throat is closing onset Saturday. Pt sts worse with laying down. Pt denies pain. Pt sts able to eat and drink without issue. Pt sts saliva feels thicker. Pt denies new product, foods. Pt is a/ox4, rsp nonlabored and pwd.

## 2022-09-08 NOTE — DISCHARGE INSTRUCTIONS
Please return to the ED for any worsening symptoms. Otherwise you may follow-up with your PCP. Should mild symptoms recur contact information for ENT is as above.   Please give them a call

## 2022-09-08 NOTE — ED PROVIDER NOTES
[address]        Pt Name: Mariajose Montgomery  MRN: [id number]  [place] 1970  Date of evaluation: 9/8/2022  Provider: DANUTA Dyer - CNP  PCP: Angel Rodriguez MD  Note Started: 7:28 AM EDT       I have seen and evaluated this patient with my supervising physician Roberto Bee MD.      [address]       Chief Complaint   Patient presents with    Oral Swelling     Pt arrives ambulatory for eval of feeling that throat is closing onset Saturday         HISTORY OF PRESENT ILLNESS   (Location/Symptom, Timing/Onset, Context/Setting, Quality, Duration, Modifying Factors, Severity)  Note limiting factors. Chief Complaint: Yuri Montgomery is a 47 y.o. female who presents to the ED with concern for sore throat x2 weeks, hoarse voice x1 week, sensation of throat closing since Saturday getting worse with acutely worsening since 1 AM today when she felt like she was having a hard time catching her breath. Denies any sick contacts. Denies any changes to medications. Denies any allergen exposure. Nobody else at home is sick. She is also concerned that her saliva \"feels thicker\". Able to swallow at time of exam without distress    Nursing Notes were all reviewed and agreed with or any disagreements were addressed in the HPI. REVIEW OF SYSTEMS    (2-9 systems for level 4, 10 or more for level 5)     Review of Systems   Constitutional:  Negative for chills, diaphoresis and fever. HENT:  Positive for sore throat and voice change. Negative for congestion, rhinorrhea and trouble swallowing. Eyes:  Negative for pain and visual disturbance. Respiratory:  Negative for cough and shortness of breath. Cardiovascular:  Negative for chest pain and leg swelling. Gastrointestinal:  Negative for abdominal pain, constipation, diarrhea, nausea and vomiting. Genitourinary:  Negative for frequency and hematuria. Musculoskeletal:  Negative for back pain and neck pain. Skin:  Negative for rash and wound. Neurological:  Negative for dizziness and light-headedness. PAST MEDICAL HISTORY     Past Medical History:   Diagnosis Date    AAA (abdominal aortic aneurysm) ([facility] [address]     Breast cancer ([facility] [address]     left    Hypertension     S/P right hemicolectomy 01/13/2017       SURGICAL HISTORY     Past Surgical History:   Procedure Laterality Date    BREAST BIOPSY      ENDOSCOPIC ULTRASOUND (UPPER)  06/22/2017    HYSTERECTOMY (CERVIX STATUS UNKNOWN)      MASTECTOMY      RIGHT COLECTOMY  01/13/2017    lap abd washout, lap assisted R colectomy, vaginal exam under anesthesia    UPPER GASTROINTESTINAL ENDOSCOPY  01/24/2017       [address]       Discharge Medication List as of 9/8/2022  8:22 AM        CONTINUE these medications which have NOT CHANGED    Details   hydrochlorothiazide (MICROZIDE) 12.5 MG capsule Take 12.5 mg by mouth dailyHistorical Med      MULTIPLE VITAMIN PO Take 1 tablet by mouth daily Historical Med      Omega-3 Fatty Acids (FISH OIL) 1200 MG CAPS Take 1 capsule by mouth daily Historical Med      Calcium-Magnesium-Vitamin D (CALCIUM 500 PO) Take  by mouth 2 times daily. carvedilol (COREG) 3.125 MG tablet Take 3.125 mg by mouth 2 times daily (with meals).              ALLERGIES     Meperidine and Other    FAMILYHISTORY       Family History   Problem Relation Age of Onset    Hypertension Mother     Cancer Mother         breast    Hypertension Father     Cancer Maternal Grandmother         colon        SOCIAL HISTORY       Social History     Socioeconomic History    Marital status:      Spouse name: None    Number of children: None    Years of education: None    Highest education level: None   Tobacco Use    Smoking status: Never    Smokeless tobacco: Never   Vaping Use    Vaping Use: Never used   Substance and Sexual Activity    Alcohol use: Yes     Comment: occassionally    Drug use: No    Sexual activity: Yes     Partners: Male       SCREENINGS    [place] Coma Scale  Eye Opening: Spontaneous  Best Verbal Response: Oriented  Best Motor Response: Obeys commands  Chas Coma Scale Score: 15        PHYSICAL EXAM    (up to 7 for level 4, 8 or more for level 5)     ED Triage Vitals [09/08/22 0717]   BP Temp Temp Source Heart Rate Resp SpO2 Height Weight   (!) 147/94 97.4 °F (36.3 °C) Oral 59 16 100 % 5' 5\" (1.651 m) 110 lb 10.7 oz (50.2 kg)       Physical Exam  Vitals and nursing note reviewed. Constitutional:       Appearance: Normal appearance. She is obese. She is not ill-appearing, toxic-appearing or diaphoretic. HENT:      Head: Normocephalic and atraumatic. Jaw: No trismus or pain on movement. Right Ear: External ear normal. There is impacted cerumen. Left Ear: External ear normal. There is impacted cerumen. Nose: Nose normal. No congestion or rhinorrhea. Mouth/Throat:      Lips: No lesions. Mouth: Mucous membranes are moist.      Tongue: Tongue does not deviate from midline. Palate: No mass. Pharynx: Oropharynx is clear. Uvula midline. Posterior oropharyngeal erythema present. No oropharyngeal exudate or uvula swelling. Tonsils: No tonsillar exudate or tonsillar abscesses. 0 on the right. 0 on the left. Comments: Airway patent  Eyes:      General: No scleral icterus. Right eye: No discharge. Left eye: No discharge. Extraocular Movements: Extraocular movements intact. Conjunctiva/sclera: Conjunctivae normal.      Pupils: Pupils are equal, round, and reactive to light. Cardiovascular:      Rate and Rhythm: Normal rate and regular rhythm. Pulses: Normal pulses. Heart sounds: Normal heart sounds. No murmur heard. No friction rub. No gallop. Pulmonary:      Effort: Pulmonary effort is normal. No respiratory distress. Breath sounds: Normal breath sounds. No stridor. No wheezing, rhonchi or rales.    Abdominal: General: Abdomen is flat. Bowel sounds are normal. There is no distension. Palpations: Abdomen is soft. Tenderness: no abdominal tenderness There is no right CVA tenderness, left CVA tenderness or guarding. Musculoskeletal:         General: No deformity. Normal range of motion. Cervical back: Normal range of motion and neck supple. No rigidity or tenderness. Skin:     General: Skin is warm and dry. Capillary Refill: Capillary refill takes less than 2 seconds. Coloration: Skin is not jaundiced or pale. Findings: No rash. Neurological:      General: No focal deficit present. Mental Status: She is alert and oriented to person, place, and time. Mental status is at baseline. Psychiatric:         Mood and Affect: Mood normal.         Behavior: Behavior normal.       DIAGNOSTIC RESULTS   LABS:    Labs Reviewed   COVID-19, RAPID   RAPID INFLUENZA A/B ANTIGENS   STREP SCREEN GROUP A THROAT   CULTURE, BETA STREP CONFIRM PLATES       When ordered, only abnormal lab results are displayed. All other labs were within normal range or not returned as of this dictation. EKG: When ordered, EKG's are interpreted by the Emergency Department Physician in the absence of a cardiologist.  Please see their note for interpretation of EKG. RADIOLOGY:   Non-plain film images such as CT, Ultrasound and MRI are read by the radiologist. Plain radiographic images are visualized andpreliminarily interpreted by the  ED Provider with the below findings:        Interpretation perthe Radiologist below, if available at the time of this note:    XR NECK SOFT TISSUE   Final Result   Normal soft tissues the neck. Patent airway. Normal epiglottis. No foreign   body. No results found.       PROCEDURES   Unless otherwise noted below, none     Procedures    CRITICAL CARE TIME   Byron PHAN CNP, am the primary clinician of record  I provided 5 minutes of non concurrent Critical Care time, excluding separately reportable procedures. There was a high probability of clinically significant/life threatening deterioration in the patient's condition which required my urgent intervention. This time spent assessing, reassessing patient, chart review and discussing case with other providers      CONSULTS:  None      EMERGENCY DEPARTMENT COURSE and DIFFERENTIAL DIAGNOSIS/MDM:   Vitals:    Vitals:    09/08/22 0717   BP: (!) 147/94   Pulse: 59   Resp: 16   Temp: 97.4 °F (36.3 °C)   TempSrc: Oral   SpO2: 100%   Weight: 110 lb 10.7 oz (50.2 kg)   Height: 5' 5\" (1.651 m)       Patient was given thefollowing medications:  Medications   lidocaine viscous hcl (XYLOCAINE) 2 % solution 15 mL (15 mLs Mouth/Throat Not Given 9/8/22 5937)         Is this patient to be included in the SEP-1 Core Measure due to severe sepsis or septic shock? No   Exclusion criteria - the patient is NOT to be included for SEP-1 Core Measure due to:  2+ SIRS criteria are not met    Differential diagnoses: Airway Obstruction, Epiglottitis, Retropharyngeal Abscess, Parapharyngeal Abscess, Pneumonia, Hypoxemia, Dehydration, other. 13-year-old presenting as above. Rapid COVID, influenza and strep testing ordered. Results as above. X-ray soft tissue neck as above. Vitals are stable. The patient has not acute respiratory distress. She controls her secretions without difficulty. She is nontoxic in appearance. On lung exam, patient does not have any wheezing, Rales, rhonchi and is not in any respiratory distress. No evidence of severe respiratory distress, severe upper or lower airway compromise, hypoxemia, toxicity, shock, sepsis, hemodynamic or cardiopulmonary instability, epiglottitis, peritonsillar abscess, retropharyngeal abscess, bacterial tracheitis, pneumonia, or any disease process requiring other immediate surgical or medical intervention at this time.      Findings from today were discussed with patient    The patient is at low risk for mortality based on demographic, history and clinical factors. Given the best available information and clinical assessment, I estimate the risk of hospitalization to be greater than risk of treatment at home. I have explained to the patient that the risk could rapidly change, given precautions for return and instructions. Explained to patient that the risk for mortality is low based on demographic, history and clinical factors. I discussed with patient the results of evaluation in the ED, diagnosis, care, and prognosis. The plan is to discharge to home. Patient is in agreement with plan and questions have been answered. I also discussed with patient the reasons which may require a return visit and the importance of follow-up care. The patient is well-appearing, nontoxic, and improved at the time of discharge. Patient agrees to call to arrange follow-up care as directed. Patient understands to return immediately for worsening/change in symptoms. I am the Primary Clinician of Record. FINAL IMPRESSION      1. Throat tightness    2. Sore throat    3. Anxiety state    4.  Elevated blood pressure reading          DISPOSITION/PLAN   DISPOSITION Decision To Discharge 09/08/2022 08:16:43 AM      PATIENT REFERREDTO:  Arturo Leal MD  [address] [address]  [phone]    Call in 1 day  Follow up on your recent ED visit    [facility] [facility] [place] [address]  [phone]    Call   As needed, If symptoms worsen    DISCHARGE MEDICATIONS:  Discharge Medication List as of 9/8/2022  8:22 AM        START taking these medications    Details   lidocaine viscous hcl (XYLOCAINE) 2 % SOLN solution Take 5 mLs by mouth 3 times daily as needed for Irritation, Disp-1 each, R-0Normal      hydrOXYzine HCl (ATARAX) 25 MG tablet Take 1 tablet by mouth every 8 hours as needed for Itching or Anxiety, Disp-30 tablet, R-0Normal DISCONTINUED MEDICATIONS:  Discharge Medication List as of 9/8/2022  8:22 AM                 (Please note that portions ofthis note were completed with a voice recognition program.  Efforts were made to edit the dictations but occasionally words are mis-transcribed.)    DANUTA Rowe CNP (electronically signed)             DANUTA Rowe CNP  09/08/22 8894

## 2022-09-08 NOTE — ED NOTES
Writer called pharmacy, xylocaine out of stock. Gabino Farmer NP notified.      Luz Henao RN  09/08/22 [address], RN  09/08/22 0916

## 2022-09-08 NOTE — ED NOTES
Pt sts she has alos lost her balance a few time over the past few weeks. Clarisa Zuniga NP informed.      Terese Miller RN  09/08/22 9747

## 2022-09-08 NOTE — ED PROVIDER NOTES
I independently performed a history and physical on Milagro Campbell. All diagnostic, treatment, and disposition decisions were made by myself in conjunction with the advanced practice provider. For further details of [id number] emergency department encounter, please see Salazar Saeed NP's documentation. Patient complains of sore throat for the last 2 weeks as well as a hoarse voice for the last week. She has no fevers, chills or sweats. Yesterday and today she has noticed what she feels like tightness in her throat. During the night she woke up and gasp once and had an episode of shortness of breath. She denies any chest pain. On exam she has no stridor. No tongue, lip or uvular edema. Lungs clear to auscultation bilaterally and heart regular rate and rhythm. Labs Reviewed - No data to display  XR NECK SOFT TISSUE   Final Result   Normal soft tissues the neck. Patent airway. Normal epiglottis. No foreign   body. I personally saw this patient and performed a substantive portion of the visit including all aspects of the medical decision making. MDM  This patient likely has some form of pharyngitis but it does not appear to be strep. She also has anxiety which I believe is exacerbating her symptoms. We are treating her for both.      Rigo Connors MD  09/08/22 [id number]

## 2022-09-10 LAB — S PYO THROAT QL CULT: NORMAL

## 2022-10-06 ENCOUNTER — HOSPITAL ENCOUNTER (OUTPATIENT)
Dept: WOMENS IMAGING | Age: 52
Discharge: HOME | End: 2022-10-06
Payer: COMMERCIAL

## 2022-10-06 DIAGNOSIS — Z12.31 VISIT FOR SCREENING MAMMOGRAM: ICD-10-CM

## 2022-10-06 PROCEDURE — 77063 BREAST TOMOSYNTHESIS BI: CPT

## 2023-10-12 ENCOUNTER — HOSPITAL ENCOUNTER (OUTPATIENT)
Dept: WOMENS IMAGING | Age: 53
Discharge: HOME | End: 2023-10-12
Payer: COMMERCIAL

## 2023-10-12 DIAGNOSIS — Z12.31 BREAST CANCER SCREENING BY MAMMOGRAM: ICD-10-CM

## 2023-10-12 PROCEDURE — 77063 BREAST TOMOSYNTHESIS BI: CPT

## 2024-10-24 ENCOUNTER — HOSPITAL ENCOUNTER (OUTPATIENT)
Dept: WOMENS IMAGING | Age: 54
Discharge: HOME | End: 2024-10-24
Payer: COMMERCIAL

## 2024-10-24 VITALS — BODY MASS INDEX: 18.33 KG/M2 | HEIGHT: 65 IN | WEIGHT: 110 LBS

## 2024-10-24 DIAGNOSIS — Z12.31 SCREENING MAMMOGRAM FOR BREAST CANCER: ICD-10-CM

## 2024-10-24 PROCEDURE — 77063 BREAST TOMOSYNTHESIS BI: CPT

## 2025-02-04 ENCOUNTER — HOSPITAL ENCOUNTER (OUTPATIENT)
Dept: WOMENS IMAGING | Age: 55
Discharge: HOME | End: 2025-02-04
Attending: INTERNAL MEDICINE
Payer: COMMERCIAL

## 2025-02-04 DIAGNOSIS — M81.0 POST-MENOPAUSAL OSTEOPOROSIS: ICD-10-CM

## 2025-02-04 PROCEDURE — 77080 DXA BONE DENSITY AXIAL: CPT
